# Patient Record
Sex: FEMALE | URBAN - METROPOLITAN AREA
[De-identification: names, ages, dates, MRNs, and addresses within clinical notes are randomized per-mention and may not be internally consistent; named-entity substitution may affect disease eponyms.]

---

## 2020-11-02 ENCOUNTER — TELEPHONE ENCOUNTER (OUTPATIENT)
Dept: URBAN - METROPOLITAN AREA CLINIC 113 | Facility: CLINIC | Age: 55
End: 2020-11-02

## 2020-12-02 ENCOUNTER — OFFICE VISIT (OUTPATIENT)
Dept: URBAN - METROPOLITAN AREA CLINIC 113 | Facility: CLINIC | Age: 55
End: 2020-12-02

## 2020-12-02 NOTE — HPI-TODAY'S VISIT:
This is a 55-year-old woman referred by Dr. Felton for evaluation of elevated liver enzymes. Labs 10/16/2020 : Free T4 1.82, cholesterol 226, triglyceride 54, HDL 93, , sodium 141, potassium 4.7, BUN 11, creatinine 0.61, T bili 0.6, ALT 74, AST 87, ALP 41, T4 9.2, T3 2.5, WBC 5.9, hemoglobin 12.7, hematocrit 37, MCV 94, platelets 279

## 2021-01-04 ENCOUNTER — WEB ENCOUNTER (OUTPATIENT)
Dept: URBAN - METROPOLITAN AREA CLINIC 113 | Facility: CLINIC | Age: 56
End: 2021-01-04

## 2021-01-04 ENCOUNTER — OFFICE VISIT (OUTPATIENT)
Dept: URBAN - METROPOLITAN AREA CLINIC 113 | Facility: CLINIC | Age: 56
End: 2021-01-04
Payer: COMMERCIAL

## 2021-01-04 VITALS
WEIGHT: 148 LBS | SYSTOLIC BLOOD PRESSURE: 117 MMHG | HEART RATE: 73 BPM | RESPIRATION RATE: 18 BRPM | DIASTOLIC BLOOD PRESSURE: 76 MMHG | BODY MASS INDEX: 22.43 KG/M2 | HEIGHT: 68 IN | TEMPERATURE: 98.2 F

## 2021-01-04 DIAGNOSIS — K76.89 LIVER LESION: ICD-10-CM

## 2021-01-04 DIAGNOSIS — R79.89 ELEVATED LFTS: ICD-10-CM

## 2021-01-04 PROCEDURE — G8427 DOCREV CUR MEDS BY ELIG CLIN: HCPCS | Performed by: NURSE PRACTITIONER

## 2021-01-04 PROCEDURE — G8483 FLU IMM NO ADMIN DOC REA: HCPCS | Performed by: NURSE PRACTITIONER

## 2021-01-04 PROCEDURE — 99204 OFFICE O/P NEW MOD 45 MIN: CPT | Performed by: NURSE PRACTITIONER

## 2021-01-04 PROCEDURE — G8420 CALC BMI NORM PARAMETERS: HCPCS | Performed by: NURSE PRACTITIONER

## 2021-01-04 RX ORDER — ESCITALOPRAM OXALATE 10 MG/1
1 TABLET TABLET, FILM COATED ORAL ONCE A DAY
Status: ACTIVE | COMMUNITY

## 2021-01-04 RX ORDER — LEVOTHYROXINE SODIUM 0.05 MG/1
1 TABLET IN THE MORNING ON AN EMPTY STOMACH TABLET ORAL ONCE A DAY
Status: ACTIVE | COMMUNITY

## 2021-01-04 NOTE — HPI-OTHER HISTORIES
55-year-old woman referred by Dr. Felton for evaluation of elevated liver function tests and fatty liver disease. she has a past medical history of hypothyroidism/autoimmune thyroiditis, ankle surgery. Other than her synthroid and Lexapro, she is taking some over the counter supplements: magnesium,  fish oil, hair/skin/nails. She reports a weight gain of about 20 pounds over the last 1.5 years. She drinks ETOH, about 3 drinks per day. She does report mild depression with working from home.   She is without any abdominal complaints. No dysphagia, heartburn, regurgitation, unintentional weight loss, nausea, vomiting, hematemesis or melena. Bowels are moving regularly without blood per rectum. No complaints of bloating. No jaundice, icterus.  Abdominal ultrasound 11/6/2020: Hepatic steatosis without sonographic evidence to suggest liver fibrosis.  There is an indeterminate 2 cm left liver lobe lesion recommended CT or MRI of abdomen with contrast for further characterization.  No sonographic evidence to suggest cholelithiasis or acute cholecystitis.  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  Additional small hypoattenuation within the posterior hepatic segment renal image 31.  Labs 10/16/2020: Free T4 1.82, cholesterol 226, triglycerides 54, HDL 93, , sodium 141, potassium 4.7, glucose 62, BUN 11, creatinine 0.61, T bili 0.6, ALT 74, AST 87, ALP 41, WBC 5.9, hemoglobin 12.7, hematocrit 37, MCV 94, MCH 32.2, platelets 279

## 2021-04-05 ENCOUNTER — OFFICE VISIT (OUTPATIENT)
Dept: URBAN - METROPOLITAN AREA CLINIC 107 | Facility: CLINIC | Age: 56
End: 2021-04-05

## 2021-05-20 ENCOUNTER — DASHBOARD ENCOUNTERS (OUTPATIENT)
Age: 56
End: 2021-05-20

## 2021-05-20 PROBLEM — 300331000 LESION OF LIVER: Status: ACTIVE | Noted: 2021-01-04

## 2021-05-21 ENCOUNTER — OFFICE VISIT (OUTPATIENT)
Dept: URBAN - METROPOLITAN AREA CLINIC 107 | Facility: CLINIC | Age: 56
End: 2021-05-21

## 2021-05-21 RX ORDER — LEVOTHYROXINE SODIUM 0.05 MG/1
1 TABLET IN THE MORNING ON AN EMPTY STOMACH TABLET ORAL ONCE A DAY
Status: ACTIVE | COMMUNITY

## 2021-05-21 RX ORDER — ESCITALOPRAM OXALATE 10 MG/1
1 TABLET TABLET, FILM COATED ORAL ONCE A DAY
Status: ACTIVE | COMMUNITY

## 2021-05-21 NOTE — HPI-OTHER HISTORIES
55-year-old woman referred by Dr. Felton for evaluation of elevated liver function tests and fatty liver disease. she has a past medical history of hypothyroidism/autoimmune thyroiditis, ankle surgery. Other than her synthroid and Lexapro, she is taking some over the counter supplements: magnesium,  fish oil, hair/skin/nails. She reports a weight gain of about 20 pounds over the last 1.5 years. She drinks ETOH, about 3 drinks per day. She does report mild depression with working from home.   She is without any abdominal complaints. No dysphagia, heartburn, regurgitation, unintentional weight loss, nausea, vomiting, hematemesis or melena. Bowels are moving regularly without blood per rectum. No complaints of bloating. No jaundice, icterus.  Abdominal ultrasound 11/6/2020: Hepatic steatosis without sonographic evidence to suggest liver fibrosis.  There is an indeterminate 2 cm left liver lobe lesion recommended CT or MRI of abdomen with contrast for further characterization.  No sonographic evidence to suggest cholelithiasis or acute cholecystitis.  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  Additional small hypoattenuation within the posterior hepatic segment renal image 31.  Labs 10/16/2020: Free T4 1.82, cholesterol 226, triglycerides 54, HDL 93, , sodium 141, potassium 4.7, glucose 62, BUN 11, creatinine 0.61, T bili 0.6, ALT 74, AST 87, ALP 41, WBC 5.9, hemoglobin 12.7, hematocrit 37, MCV 94, MCH 32.2, platelets 279

## 2021-05-27 ENCOUNTER — TELEPHONE ENCOUNTER (OUTPATIENT)
Dept: URBAN - METROPOLITAN AREA CLINIC 113 | Facility: CLINIC | Age: 56
End: 2021-05-27

## 2024-08-26 ENCOUNTER — LAB OUTSIDE AN ENCOUNTER (OUTPATIENT)
Dept: URBAN - METROPOLITAN AREA SURGERY CENTER 25 | Facility: SURGERY CENTER | Age: 59
End: 2024-08-26

## 2024-09-04 ENCOUNTER — CLAIMS CREATED FROM THE CLAIM WINDOW (OUTPATIENT)
Dept: URBAN - METROPOLITAN AREA SURGERY CENTER 25 | Facility: SURGERY CENTER | Age: 59
End: 2024-09-04
Payer: COMMERCIAL

## 2024-09-04 DIAGNOSIS — Z12.11 ENCOUNTER FOR SCREENING FOR MALIGNANT NEOPLASM OF COLON: ICD-10-CM

## 2024-09-04 DIAGNOSIS — K57.30 COLON, DIVERTICULOSIS: ICD-10-CM

## 2024-09-04 DIAGNOSIS — Z12.11 COLON CANCER SCREENING (HIGH RISK): ICD-10-CM

## 2024-09-04 DIAGNOSIS — K64.0 FIRST DEGREE HEMORRHOIDS: ICD-10-CM

## 2024-09-04 PROCEDURE — 0528F RCMND FLW-UP 10 YRS DOCD: CPT | Performed by: INTERNAL MEDICINE

## 2024-09-04 PROCEDURE — G0121 COLON CA SCRN NOT HI RSK IND: HCPCS | Performed by: INTERNAL MEDICINE

## 2024-09-04 PROCEDURE — 00812 ANES LWR INTST SCR COLSC: CPT | Performed by: NURSE ANESTHETIST, CERTIFIED REGISTERED

## 2024-09-04 RX ORDER — ESCITALOPRAM OXALATE 10 MG/1
1 TABLET TABLET, FILM COATED ORAL ONCE A DAY
Status: ACTIVE | COMMUNITY

## 2024-09-04 RX ORDER — LEVOTHYROXINE SODIUM 0.05 MG/1
1 TABLET IN THE MORNING ON AN EMPTY STOMACH TABLET ORAL ONCE A DAY
Status: ACTIVE | COMMUNITY